# Patient Record
Sex: FEMALE
[De-identification: names, ages, dates, MRNs, and addresses within clinical notes are randomized per-mention and may not be internally consistent; named-entity substitution may affect disease eponyms.]

---

## 2019-02-02 ENCOUNTER — HOSPITAL ENCOUNTER (EMERGENCY)
Dept: HOSPITAL 5 - ED | Age: 29
LOS: 1 days | Discharge: HOME | End: 2019-02-03
Payer: MEDICAID

## 2019-02-02 DIAGNOSIS — K59.09: ICD-10-CM

## 2019-02-02 DIAGNOSIS — Z79.899: ICD-10-CM

## 2019-02-02 DIAGNOSIS — N30.00: Primary | ICD-10-CM

## 2019-02-02 DIAGNOSIS — I10: ICD-10-CM

## 2019-02-02 DIAGNOSIS — Z88.1: ICD-10-CM

## 2019-02-02 DIAGNOSIS — Z88.0: ICD-10-CM

## 2019-02-02 DIAGNOSIS — J45.909: ICD-10-CM

## 2019-02-02 LAB
BACTERIA #/AREA URNS HPF: (no result) /HPF
BILIRUB UR QL STRIP: (no result)
BLOOD UR QL VISUAL: (no result)
MUCOUS THREADS #/AREA URNS HPF: (no result) /HPF
PH UR STRIP: 5 [PH] (ref 5–7)
RBC #/AREA URNS HPF: 4 /HPF (ref 0–6)
UROBILINOGEN UR-MCNC: 2 MG/DL (ref ?–2)
WBC #/AREA URNS HPF: 6 /HPF (ref 0–6)

## 2019-02-02 PROCEDURE — 99283 EMERGENCY DEPT VISIT LOW MDM: CPT

## 2019-02-02 PROCEDURE — 81001 URINALYSIS AUTO W/SCOPE: CPT

## 2019-02-02 PROCEDURE — 74019 RADEX ABDOMEN 2 VIEWS: CPT

## 2019-02-02 PROCEDURE — 81025 URINE PREGNANCY TEST: CPT

## 2019-02-02 NOTE — EMERGENCY DEPARTMENT REPORT
ED Abdominal Pain HPI





- General


Chief Complaint: Abdominal Pain


Stated Complaint: CONSTIPATION


Time Seen by Provider: 19 22:55


Source: patient


Mode of arrival: Ambulatory


Limitations: No Limitations





- History of Present Illness


MD Complaint: abdominal pain, other (constipation)


Onset/Timin


-: days(s)


Location: diffuse


Radiation: none


Migration to: no migration


Severity scale (0 -10): 2 (2/10)


Quality: cramping, fullness


Consistency: intermittent


Improves With: nothing


Worsens With: nothing


Context: other (constipation)


Associated Symptoms: nausea, constipation, dysuria.  denies: vomiting, diarrhea,

fever, chills, hematemesis, hematochezia, melena, hematuria, anorexia, syncope


Treatments Prior to Arrival: other (over-the-counter medication for constipation

without any relief)





- Related Data


LMP Date: 19


                                Home Medications











 Medication  Instructions  Recorded  Confirmed  Last Taken


 


Motrin 600 MG tab 600 mg PO TID 11/29/15 07/23/16 1 Week Ago





    ~16


 


Ferrous Sulfate 2 tab PO QDAY 16 1 Week Ago





    ~16








                                  Previous Rx's











 Medication  Instructions  Recorded  Last Taken  Type


 


Pnv with Ca,No.72/Iron/FA 1 each PO QDAY #30 tablet 11/29/15 1 Day Ago Rx





[Prenatal Plus Multivitamin Tab]   ~16 


 


Labetalol [Normodyne TAB] 100 mg PO BID #60 tablet 16 2 Days Ago Rx





   ~16 


 


Metoprolol [Lopressor TAB] 25 mg PO Q8HR #90 tablet 16 Unknown Rx


 


Bisacodyl [Dulcolax] 10 mg PO ONCE 1 Days #2 tab 19 Unknown Rx


 


Magnesium Citrate [Citrate of 300 ml PO ONCE 1 Days #1 bottle 19 Unknown 

Rx





Magnesia]    


 


Ondansetron [Zofran ODT TAB] 8 mg PO Q8HR PRN #12 tab.rapdis 19 Unknown Rx


 


Sulfamethoxazole/Trimethoprim 1 each PO BID 7 Days #14 tablet 19 Unknown 

Rx





[Bactrim DS TAB]    











                                    Allergies











Allergy/AdvReac Type Severity Reaction Status Date / Time


 


amoxicillin [Amoxicillin] Allergy  Itching Verified 11/29/15 01:11


 


metoclopramide HCl Allergy  Itching Verified 11/29/15 01:11





[From Reglan]     


 


Penicillins Allergy  Itching Verified 11/29/15 01:11


 


promethazine HCl Allergy  Itching Verified 11/29/15 01:11





[From Phenergan]     














ED Review of Systems


ROS: 


Stated complaint: CONSTIPATION


Other details as noted in HPI





Constitutional: denies: chills, fever


ENT: denies: throat pain


Respiratory: denies: cough, shortness of breath, wheezing


Cardiovascular: denies: chest pain, palpitations, edema, syncope


Gastrointestinal: nausea, constipation.  denies: abdominal pain, vomiting, 

diarrhea, hematemesis, hematochezia


Genitourinary: dysuria.  denies: frequency, hematuria, discharge


Musculoskeletal: denies: back pain, joint swelling, arthralgia, myalgia


Skin: denies: rash


Neurological: denies: headache, abnormal gait, vertigo





ED Past Medical Hx





- Past Medical History


Previous Medical History?: Yes (history of)


Hx Hypertension: Yes


Hx Congestive Heart Failure: No


Hx Diabetes: No


Hx Deep Vein Thrombosis: No


Hx Renal Disease: No


Hx Sickle Cell Disease: No


Hx Seizures: No


Hx Asthma: Yes (occas inhaler use)


Hx COPD: No


Hx HIV: No





- Surgical History


Past Surgical History?: No





- Family History


Family history: hypertension





- Social History


Smoking Status: Never Smoker


Substance Use Type: None





- Medications


Home Medications: 


                                Home Medications











 Medication  Instructions  Recorded  Confirmed  Last Taken  Type


 


Motrin 600 MG tab 600 mg PO TID 11/29/15 07/23/16 1 Week Ago History





    ~16 


 


Pnv with Ca,No.72/Iron/FA 1 each PO QDAY #30 tablet 11/29/15 07/23/16 1 Day Ago 

Rx





[Prenatal Plus Multivitamin Tab]    ~16 


 


Ferrous Sulfate 2 tab PO QDAY 16 1 Week Ago History





    ~16 


 


Labetalol [Normodyne TAB] 100 mg PO BID #60 tablet 16 2 Days Ago 

Rx





    ~16 


 


Metoprolol [Lopressor TAB] 25 mg PO Q8HR #90 tablet 16  Unknown Rx


 


Bisacodyl [Dulcolax] 10 mg PO ONCE 1 Days #2 tab 19  Unknown Rx


 


Magnesium Citrate [Citrate of 300 ml PO ONCE 1 Days #1 bottle 19  Unknown 

Rx





Magnesia]     


 


Ondansetron [Zofran ODT TAB] 8 mg PO Q8HR PRN #12 tab.rapdis 19  Unknown 

Rx


 


Sulfamethoxazole/Trimethoprim 1 each PO BID 7 Days #14 tablet 19  Unknown 

Rx





[Bactrim DS TAB]     














ED Physical Exam





- General


Limitations: No Limitations (-year-old getting better.  No deformity history 

blood with)


General appearance: alert, in no apparent distress





- Head


Head exam: Present: atraumatic, normocephalic, normal inspection





- Eye


Eye exam: Present: normal appearance, PERRL, EOMI


Pupils: Present: normal accommodation





- ENT


ENT exam: Present: normal exam, normal orophraynx, mucous membranes moist, TM's 

normal bilaterally, normal external ear exam





- Neck


Neck exam: Present: normal inspection, full ROM.  Absent: tenderness, 

lymphadenopathy





- Respiratory


Respiratory exam: Present: normal lung sounds bilaterally.  Absent: respiratory 

distress, chest wall tenderness





- Cardiovascular


Cardiovascular Exam: Present: regular rate, normal rhythm, normal heart sounds. 

Absent: systolic murmur





- GI/Abdominal


GI/Abdominal exam: Present: soft, normal bowel sounds.  Absent: distended, 

tenderness, guarding, rebound, rigid, organomegaly, mass (O:)





- Extremities Exam


Extremities exam: Present: normal inspection, full ROM, normal capillary refill,

other.  Absent: tenderness, pedal edema, joint swelling, calf tenderness





- Back Exam


Back exam: Present: normal inspection, full ROM, other.  Absent: tenderness, CVA

tenderness (R), CVA tenderness (L)





- Neurological Exam


Neurological exam: Present: alert, oriented X3, normal gait





- Psychiatric


Psychiatric exam: Present: normal affect, normal mood





- Skin


Skin exam: Present: warm, dry, intact, normal color.  Absent: rash





ED Course


                                   Vital Signs











  19





  21:21 21:25 01:14


 


Temperature 99.3 F 99.3 F 97.5 F L


 


Pulse Rate 94 H 94 H 76


 


Respiratory 18 18 16





Rate   


 


Blood Pressure 119/83 119/83 


 


Blood Pressure   126/86





[Left]   


 


O2 Sat by Pulse 95 95 98





Oximetry   














- Reevaluation(s)


Reevaluation #1: 





19 00:54


Patient received zofran 4 mg ODT with relief of nausea





ED Medical Decision Making





- Lab Data








                                   Lab Results











  19 Range/Units





  Unknown Unknown 


 


Urine Color   Kasey  (Yellow)  


 


Urine Turbidity   Slightly-cloudy  (Clear)  


 


Urine pH   5.0  (5.0-7.0)  


 


Ur Specific Gravity   1.028  (1.003-1.030)  


 


Urine Protein   30 mg/dl  (Negative)  mg/dL


 


Urine Glucose (UA)   Neg  (Negative)  mg/dL


 


Urine Ketones   Tr  (Negative)  mg/dL


 


Urine Blood   Neg  (Negative)  


 


Urine Nitrite   Neg  (Negative)  


 


Urine Bilirubin   Neg  (Negative)  


 


Urine Urobilinogen   2.0  (<2.0)  mg/dL


 


Ur Leukocyte Esterase   Mod  (Negative)  


 


Urine WBC (Auto)   6.0  (0.0-6.0)  /HPF


 


Urine RBC (Auto)   4.0  (0.0-6.0)  /HPF


 


U Epithel Cells (Auto)   12.0  (0-13.0)  /HPF


 


Urine Bacteria (Auto)   1+  (Negative)  /HPF


 


Urine Mucus   3+  /HPF


 


Urine HCG, Qual  Negative   (Negative)  








Urine CX sent





- Radiology Data


Radiology results: report reviewed





X-ray two-view abdomen dictated by radiologist and report reviewed by myself.  

This shows moderate stool in the rectosigmoid colon with nonobstructive bowel 

gas pattern.  I am unable to populate radiology result to this area due to 

malfunction of radiology system.  Please see report section for details





- Medical Decision Making





This is a 28-year-old female here report that she is constipated although she 

had a bowel movement yesterday.  She says she felt like she is needs to go some 

more.  X-ray of the abdomen was done and showed moderate stool and colon.  

Patient also had urinalysis which is negative for pregnancy and positive for 

urinary tract infection and urine cultures sent.  I discussed diagnosis, x-ray 

results and laboratory results the patient's and she voiced understanding.  

Patient discharged home in stable condition with prescription for Bactrim DS, 

Dulcolax tablet and magnesium citrate liquid.  I discussed with her she is to 

follow-up with her primary care physician and if her condition worsens to return

 to the emergency room.  Her vital signs are stable and she is febrile and in no

 acute distress.





- Differential Diagnosis


bowel obstruction, UTI, constipation


Critical care attestation.: 


If time is entered above; I have spent that time in minutes in the direct care 

of this critically ill patient, excluding procedure time.








ED Disposition


Clinical Impression: 


 Dysuria





Constipation


Qualifiers:


 Constipation type: other constipation type Qualified Code(s): K59.09 - Other 

constipation





UTI (urinary tract infection)


Qualifiers:


 Urinary tract infection type: acute cystitis Hematuria presence: without 

hematuria Qualified Code(s): N30.00 - Acute cystitis without hematuria





Disposition:  TO HOME OR SELFCARE


Is pt being admited?: No


Does the pt Need Aspirin: No


Condition: Stable


Instructions:  Constipation (ED), Urinary Tract Infection in Women (ED), High 

Fiber Diet (ED), Dysuria (ED)


Additional Instructions: 


increase fiber in diet


Take meds as prescribed


see discharge instructions on constipation and UTI


increase fluid intake to 2-3 litre of water daily


if symptoms worsens follow up in ed otherwise go to PCP and gastroenterology for

 chronic constipation


Prescriptions: 


Bisacodyl [Dulcolax] 10 mg PO ONCE 1 Days #2 tab


Magnesium Citrate [Citrate of Magnesia] 300 ml PO ONCE 1 Days #1 bottle


Ondansetron [Zofran ODT TAB] 8 mg PO Q8HR PRN #12 tab.rapdis


 PRN Reason: nausea/vomiting


Sulfamethoxazole/Trimethoprim [Bactrim DS TAB] 1 each PO BID 7 Days #14 tablet


Referrals: 


GILBERT RODRGIUEZ MD [Primary Care Provider] - 2-3 Days


[facility] [Outside] - 2-3 Days


[facility] [Provider Group] - 2-3 Days


Forms:  Work/School Release Form(ED)

## 2019-02-02 NOTE — EMERGENCY DEPARTMENT REPORT
ED Allergic Reaction HPI





- General


Chief complaint: Abdominal Pain


Stated complaint: CONSTIPATION


Time Seen by Provider: 02/02/19 22:55


Source: patient


Mode of arrival: Ambulatory


Limitations: No Limitations





- Related Data


                                Home Medications











 Medication  Instructions  Recorded  Confirmed  Last Taken


 


Motrin 600 MG tab 600 mg PO TID 11/29/15 07/23/16 1 Week Ago





    ~07/03/16


 


Ferrous Sulfate 2 tab PO QDAY 06/26/16 07/23/16 1 Week Ago





    ~07/16/16








                                  Previous Rx's











 Medication  Instructions  Recorded  Last Taken  Type


 


Pnv with Ca,No.72/Iron/FA 1 each PO QDAY #30 tablet 11/29/15 1 Day Ago Rx





[Prenatal Plus Multivitamin Tab]   ~07/09/16 


 


Labetalol [Normodyne TAB] 100 mg PO BID #60 tablet 07/18/16 2 Days Ago Rx





   ~07/21/16 


 


Metoprolol [Lopressor TAB] 25 mg PO Q8HR #90 tablet 07/24/16 Unknown Rx











                                    Allergies











Allergy/AdvReac Type Severity Reaction Status Date / Time


 


amoxicillin [Amoxicillin] Allergy  Itching Verified 11/29/15 01:11


 


metoclopramide HCl Allergy  Itching Verified 11/29/15 01:11





[From Reglan]     


 


Penicillins Allergy  Itching Verified 11/29/15 01:11


 


promethazine HCl Allergy  Itching Verified 11/29/15 01:11





[From Phenergan]     














ED Review of Systems


ROS: 


Stated complaint: CONSTIPATION


Other details as noted in HPI








ED Past Medical Hx





- Past Medical History


Previous Medical History?: Yes


Hx Hypertension: Yes


Hx Congestive Heart Failure: No


Hx Diabetes: No


Hx Deep Vein Thrombosis: No


Hx Renal Disease: No


Hx Sickle Cell Disease: No


Hx Seizures: No


Hx Asthma: Yes (occas inhaler use)


Hx COPD: No


Hx HIV: No





- Surgical History


Past Surgical History?: No





- Social History


Smoking Status: Never Smoker





- Medications


Home Medications: 


                                Home Medications











 Medication  Instructions  Recorded  Confirmed  Last Taken  Type


 


Motrin 600 MG tab 600 mg PO TID 11/29/15 07/23/16 1 Week Ago History





    ~07/03/16 


 


Pnv with Ca,No.72/Iron/FA 1 each PO QDAY #30 tablet 11/29/15 07/23/16 1 Day Ago 

Rx





[Prenatal Plus Multivitamin Tab]    ~07/09/16 


 


Ferrous Sulfate 2 tab PO QDAY 06/26/16 07/23/16 1 Week Ago History





    ~07/16/16 


 


Labetalol [Normodyne TAB] 100 mg PO BID #60 tablet 07/18/16 07/23/16 2 Days Ago 

Rx





    ~07/21/16 


 


Metoprolol [Lopressor TAB] 25 mg PO Q8HR #90 tablet 07/24/16  Unknown Rx














ED Physical Exam





- General


Limitations: No Limitations





ED Course





                                   Vital Signs











  02/02/19 02/02/19





  21:21 21:25


 


Temperature 99.3 F 99.3 F


 


Pulse Rate 94 H 94 H


 


Respiratory 18 18





Rate  


 


Blood Pressure 119/83 119/83


 


O2 Sat by Pulse 95 95





Oximetry  











Critical care attestation.: 


If time is entered above; I have spent that time in minutes in the direct care 

of this critically ill patient, excluding procedure time.








ED Disposition


Condition: Stable


Instructions:  Abdominal Pain (ED)


Referrals: 


GILBERT RODRIGUEZ MD [Primary Care Provider] - 3-5 Days

## 2019-02-03 VITALS — SYSTOLIC BLOOD PRESSURE: 126 MMHG | DIASTOLIC BLOOD PRESSURE: 86 MMHG
